# Patient Record
Sex: FEMALE | Race: OTHER | HISPANIC OR LATINO | URBAN - METROPOLITAN AREA
[De-identification: names, ages, dates, MRNs, and addresses within clinical notes are randomized per-mention and may not be internally consistent; named-entity substitution may affect disease eponyms.]

---

## 2023-02-21 ENCOUNTER — OUTPATIENT (OUTPATIENT)
Dept: OUTPATIENT SERVICES | Facility: HOSPITAL | Age: 28
LOS: 1 days | End: 2023-02-21
Payer: COMMERCIAL

## 2023-02-21 DIAGNOSIS — K02.9 DENTAL CARIES, UNSPECIFIED: ICD-10-CM

## 2023-02-21 PROCEDURE — D0140: CPT

## 2023-02-23 DIAGNOSIS — K02.63 DENTAL CARIES ON SMOOTH SURFACE PENETRATING INTO PULP: ICD-10-CM

## 2023-05-17 ENCOUNTER — OUTPATIENT (OUTPATIENT)
Dept: OUTPATIENT SERVICES | Facility: HOSPITAL | Age: 28
LOS: 1 days | End: 2023-05-17
Payer: COMMERCIAL

## 2023-05-17 DIAGNOSIS — K02.9 DENTAL CARIES, UNSPECIFIED: ICD-10-CM

## 2023-05-17 DIAGNOSIS — K01.0 EMBEDDED TEETH: ICD-10-CM

## 2023-05-17 PROCEDURE — D0140: CPT

## 2023-08-09 ENCOUNTER — OUTPATIENT (OUTPATIENT)
Dept: OUTPATIENT SERVICES | Facility: HOSPITAL | Age: 28
LOS: 1 days | End: 2023-08-09
Payer: COMMERCIAL

## 2023-08-09 DIAGNOSIS — K01.1 IMPACTED TEETH: ICD-10-CM

## 2023-08-09 DIAGNOSIS — K01.0 EMBEDDED TEETH: ICD-10-CM

## 2023-08-09 PROCEDURE — D7140: CPT

## 2023-10-09 ENCOUNTER — EMERGENCY (EMERGENCY)
Facility: HOSPITAL | Age: 28
LOS: 0 days | Discharge: HOME | End: 2023-10-10
Attending: EMERGENCY MEDICINE
Payer: MEDICAID

## 2023-10-09 VITALS
SYSTOLIC BLOOD PRESSURE: 110 MMHG | DIASTOLIC BLOOD PRESSURE: 68 MMHG | OXYGEN SATURATION: 100 % | HEART RATE: 94 BPM | RESPIRATION RATE: 16 BRPM

## 2023-10-09 VITALS
HEART RATE: 97 BPM | TEMPERATURE: 97 F | SYSTOLIC BLOOD PRESSURE: 118 MMHG | DIASTOLIC BLOOD PRESSURE: 57 MMHG | OXYGEN SATURATION: 97 % | RESPIRATION RATE: 16 BRPM

## 2023-10-09 DIAGNOSIS — M54.50 LOW BACK PAIN, UNSPECIFIED: ICD-10-CM

## 2023-10-09 DIAGNOSIS — F31.9 BIPOLAR DISORDER, UNSPECIFIED: ICD-10-CM

## 2023-10-09 DIAGNOSIS — O03.9 COMPLETE OR UNSPECIFIED SPONTANEOUS ABORTION WITHOUT COMPLICATION: ICD-10-CM

## 2023-10-09 DIAGNOSIS — R10.30 LOWER ABDOMINAL PAIN, UNSPECIFIED: ICD-10-CM

## 2023-10-09 DIAGNOSIS — O99.891 OTHER SPECIFIED DISEASES AND CONDITIONS COMPLICATING PREGNANCY: ICD-10-CM

## 2023-10-09 LAB
ALBUMIN SERPL ELPH-MCNC: 4 G/DL — SIGNIFICANT CHANGE UP (ref 3.5–5.2)
ALP SERPL-CCNC: 63 U/L — SIGNIFICANT CHANGE UP (ref 30–115)
ALT FLD-CCNC: 9 U/L — SIGNIFICANT CHANGE UP (ref 0–41)
ANION GAP SERPL CALC-SCNC: 9 MMOL/L — SIGNIFICANT CHANGE UP (ref 7–14)
APPEARANCE UR: ABNORMAL
AST SERPL-CCNC: 19 U/L — SIGNIFICANT CHANGE UP (ref 0–41)
BASOPHILS # BLD AUTO: 0.06 K/UL — SIGNIFICANT CHANGE UP (ref 0–0.2)
BASOPHILS NFR BLD AUTO: 0.7 % — SIGNIFICANT CHANGE UP (ref 0–1)
BILIRUB SERPL-MCNC: 0.3 MG/DL — SIGNIFICANT CHANGE UP (ref 0.2–1.2)
BILIRUB UR-MCNC: ABNORMAL
BLD GP AB SCN SERPL QL: SIGNIFICANT CHANGE UP
BUN SERPL-MCNC: 9 MG/DL — LOW (ref 10–20)
CALCIUM SERPL-MCNC: 9 MG/DL — SIGNIFICANT CHANGE UP (ref 8.4–10.4)
CHLORIDE SERPL-SCNC: 107 MMOL/L — SIGNIFICANT CHANGE UP (ref 98–110)
CO2 SERPL-SCNC: 23 MMOL/L — SIGNIFICANT CHANGE UP (ref 17–32)
COLOR SPEC: ABNORMAL
CREAT SERPL-MCNC: 0.8 MG/DL — SIGNIFICANT CHANGE UP (ref 0.7–1.5)
DIFF PNL FLD: ABNORMAL
EGFR: 102 ML/MIN/1.73M2 — SIGNIFICANT CHANGE UP
EOSINOPHIL # BLD AUTO: 0.26 K/UL — SIGNIFICANT CHANGE UP (ref 0–0.7)
EOSINOPHIL NFR BLD AUTO: 2.9 % — SIGNIFICANT CHANGE UP (ref 0–8)
GLUCOSE SERPL-MCNC: 96 MG/DL — SIGNIFICANT CHANGE UP (ref 70–99)
GLUCOSE UR QL: NEGATIVE MG/DL — SIGNIFICANT CHANGE UP
HCG SERPL-ACNC: 2107 MIU/ML — HIGH
HCT VFR BLD CALC: 33.4 % — LOW (ref 37–47)
HGB BLD-MCNC: 10.7 G/DL — LOW (ref 12–16)
IMM GRANULOCYTES NFR BLD AUTO: 0.3 % — SIGNIFICANT CHANGE UP (ref 0.1–0.3)
KETONES UR-MCNC: NEGATIVE MG/DL — SIGNIFICANT CHANGE UP
LEUKOCYTE ESTERASE UR-ACNC: ABNORMAL
LYMPHOCYTES # BLD AUTO: 3.22 K/UL — SIGNIFICANT CHANGE UP (ref 1.2–3.4)
LYMPHOCYTES # BLD AUTO: 35.7 % — SIGNIFICANT CHANGE UP (ref 20.5–51.1)
MCHC RBC-ENTMCNC: 27.8 PG — SIGNIFICANT CHANGE UP (ref 27–31)
MCHC RBC-ENTMCNC: 32 G/DL — SIGNIFICANT CHANGE UP (ref 32–37)
MCV RBC AUTO: 86.8 FL — SIGNIFICANT CHANGE UP (ref 81–99)
MONOCYTES # BLD AUTO: 0.56 K/UL — SIGNIFICANT CHANGE UP (ref 0.1–0.6)
MONOCYTES NFR BLD AUTO: 6.2 % — SIGNIFICANT CHANGE UP (ref 1.7–9.3)
NEUTROPHILS # BLD AUTO: 4.88 K/UL — SIGNIFICANT CHANGE UP (ref 1.4–6.5)
NEUTROPHILS NFR BLD AUTO: 54.2 % — SIGNIFICANT CHANGE UP (ref 42.2–75.2)
NITRITE UR-MCNC: POSITIVE
NRBC # BLD: 0 /100 WBCS — SIGNIFICANT CHANGE UP (ref 0–0)
PH UR: 6 — SIGNIFICANT CHANGE UP (ref 5–8)
PLATELET # BLD AUTO: 266 K/UL — SIGNIFICANT CHANGE UP (ref 130–400)
PMV BLD: 11.3 FL — HIGH (ref 7.4–10.4)
POTASSIUM SERPL-MCNC: 4.7 MMOL/L — SIGNIFICANT CHANGE UP (ref 3.5–5)
POTASSIUM SERPL-SCNC: 4.7 MMOL/L — SIGNIFICANT CHANGE UP (ref 3.5–5)
PROT SERPL-MCNC: 7.1 G/DL — SIGNIFICANT CHANGE UP (ref 6–8)
PROT UR-MCNC: 100 MG/DL
RBC # BLD: 3.85 M/UL — LOW (ref 4.2–5.4)
RBC # FLD: 14.3 % — SIGNIFICANT CHANGE UP (ref 11.5–14.5)
SODIUM SERPL-SCNC: 139 MMOL/L — SIGNIFICANT CHANGE UP (ref 135–146)
SP GR SPEC: >1.03 — HIGH (ref 1–1.03)
UROBILINOGEN FLD QL: 0.2 MG/DL — SIGNIFICANT CHANGE UP (ref 0.2–1)
WBC # BLD: 9.01 K/UL — SIGNIFICANT CHANGE UP (ref 4.8–10.8)
WBC # FLD AUTO: 9.01 K/UL — SIGNIFICANT CHANGE UP (ref 4.8–10.8)

## 2023-10-09 PROCEDURE — 87086 URINE CULTURE/COLONY COUNT: CPT

## 2023-10-09 PROCEDURE — 85025 COMPLETE CBC W/AUTO DIFF WBC: CPT

## 2023-10-09 PROCEDURE — 99285 EMERGENCY DEPT VISIT HI MDM: CPT

## 2023-10-09 PROCEDURE — 99282 EMERGENCY DEPT VISIT SF MDM: CPT

## 2023-10-09 PROCEDURE — 76830 TRANSVAGINAL US NON-OB: CPT

## 2023-10-09 PROCEDURE — 99284 EMERGENCY DEPT VISIT MOD MDM: CPT | Mod: 25

## 2023-10-09 PROCEDURE — 80053 COMPREHEN METABOLIC PANEL: CPT

## 2023-10-09 PROCEDURE — 88305 TISSUE EXAM BY PATHOLOGIST: CPT | Mod: 26

## 2023-10-09 PROCEDURE — 84702 CHORIONIC GONADOTROPIN TEST: CPT

## 2023-10-09 PROCEDURE — 36415 COLL VENOUS BLD VENIPUNCTURE: CPT

## 2023-10-09 PROCEDURE — 82962 GLUCOSE BLOOD TEST: CPT

## 2023-10-09 PROCEDURE — 81001 URINALYSIS AUTO W/SCOPE: CPT

## 2023-10-09 PROCEDURE — 86901 BLOOD TYPING SEROLOGIC RH(D): CPT

## 2023-10-09 PROCEDURE — 88305 TISSUE EXAM BY PATHOLOGIST: CPT

## 2023-10-09 PROCEDURE — 76830 TRANSVAGINAL US NON-OB: CPT | Mod: 26

## 2023-10-09 PROCEDURE — 86850 RBC ANTIBODY SCREEN: CPT

## 2023-10-09 PROCEDURE — 86900 BLOOD TYPING SEROLOGIC ABO: CPT

## 2023-10-09 RX ORDER — SODIUM CHLORIDE 9 MG/ML
1000 INJECTION, SOLUTION INTRAVENOUS ONCE
Refills: 0 | Status: COMPLETED | OUTPATIENT
Start: 2023-10-09 | End: 2023-10-09

## 2023-10-09 RX ORDER — ACETAMINOPHEN 500 MG
650 TABLET ORAL ONCE
Refills: 0 | Status: COMPLETED | OUTPATIENT
Start: 2023-10-09 | End: 2023-10-09

## 2023-10-09 RX ADMIN — SODIUM CHLORIDE 1000 MILLILITER(S): 9 INJECTION, SOLUTION INTRAVENOUS at 18:16

## 2023-10-09 RX ADMIN — Medication 650 MILLIGRAM(S): at 19:39

## 2023-10-09 NOTE — CONSULT NOTE ADULT - ASSESSMENT
27 yo  @ 9w4d, vaginal bleeding, passed products on exam, complete , clinically and hemodynamically stable    -Bleeding precautions given  -PO hydration encouraged  -repeat cbc expected drop  -Rechecked after 1 hour, saturated about 1 entire pad, bleeding improved  -Bedside U/S thin endometrial stripe  -DISPO per ED    Dr. Peña and Dr. James aware

## 2023-10-09 NOTE — PROGRESS NOTE ADULT - SUBJECTIVE AND OBJECTIVE BOX
Chief Complaint: vaginal bleeding    HPI: 27yo  @ 8w4d by LMP, presents for 5 days of vaginal bleeding, states it started as spotting and has progressed to at least 1 pad an hour over the last day, with clots, some tissue. Endorses abdominal cramping, intermittent, 6/10. She denies any lightheadedness, dizziness, nausea, vomiting, chest pain, sob, dysuria. She does not have an OBGYN she sees currently, she went to [facility] a week ago where she had a positive Upreg.     Ob/Gyn History:   (2x ft  1x sab no d&c)                 LMP -  8/3                  Denies history of ovarian cysts, uterine fibroids, abnormal paps, or STIs      OBhx:    Denies the following: constitutional symptoms, visual symptoms, cardiovascular symptoms, respiratory symptoms, GI symptoms, musculoskeletal symptoms, skin symptoms, neurologic symptoms, hematologic symptoms, allergic symptoms, psychiatric symptoms  Except any pertinent positives listed.     Home Medications:    abilify  lithium    Allergies    No Known Allergies    Intolerances        PAST MEDICAL & SURGICAL HISTORY:      FAMILY HISTORY:      SOCIAL HISTORY: Denies cigarette use, alcohol use, or illicit drug use    Vital Signs Last 24 Hrs  T(F): 97.4 (09 Oct 2023 14:46), Max: 97.4 (09 Oct 2023 14:46)  HR: 97 (09 Oct 2023 14:46) (97 - 97)  BP: 118/57 (09 Oct 2023 14:46) (118/57 - 118/57)  RR: 16 (09 Oct 2023 14:46) (16 - 16)      General Appearance - AAOx3, NAD  Abdomen - Soft, mildly tender to palpation, nondistended, no rebound, no rigidity, no guarding,     GYN/Pelvis:  External female genitalia - normal  Vagina - Normal  Cervix - Normal    Uterus:  Size - Normal  Tenderness - None  Mass - None  Freely mobile    Adnexa:  Masses - None  Tenderness - None    Meds:   acetaminophen     Tablet .. 650 milliGRAM(s) Oral once  lactated ringers Bolus 1000 milliLiter(s) IV Bolus once      LABS:                        11.8   8.23  )-----------( 268      ( 09 Oct 2023 17:36 )             36.1     HCG Quantitative, Serum: 2107.0 mIU/mL (10-09-23 @ 17:36)  ABO RH Interpretation: O POS (10-09-23 @ 20:01)  Antibody Screen: NEG (10-09-23 @ 20:01)    10-09    139  |  107  |  9<L>  ----------------------------<  96  4.7   |  23  |  0.8    Ca    9.0      09 Oct 2023 17:36    TPro  7.1  /  Alb  4.0  /  TBili  0.3  /  DBili  x   /  AST  19  /  ALT  9   /  AlkPhos  63  10-09      Urinalysis Basic - ( 09 Oct 2023 17:36 )    Color: Red / Appearance: Turbid / SG: >1.030 / pH: x  Gluc: x / Ketone: Negative mg/dL  / Bili: Moderate / Urobili: 0.2 mg/dL   Blood: x / Protein: 100 mg/dL / Nitrite: Positive   Leuk Esterase: Large / RBC: Too Numerous to count /HPF /  /HPF   Sq Epi: x / Non Sq Epi: 14 /HPF / Bacteria: Moderate /HPF          RADIOLOGY & ADDITIONAL STUDIES:  < from: US Transvaginal (10.09.23 @ 18:06) >    ACC: [id number] EXAM:  US TRANSVAGINAL   ORDERED BY: JILL WHITTINGTON     PROCEDURE DATE:  10/09/2023          INTERPRETATION:  CLINICAL HISTORY: 10 weeks pregnant patient with   abdominal pain and vaginal bleeding    COMPARISON: None.    PROCEDURE: Transabdominal and transvaginal ultrasound of the pelvis was   performed, including Doppler.    LMP: 2023    FINDINGS:    UTERUS: Anteverted measuring 9.5 x 4.4 x 5 cm, with normal echogenicity   and morphology. The endometrial echo complex is heterogeneous, with an   irregular shaped gestational sac in the lower uterine segment.    RIGHT OVARY: measures 1.9 x 1.5 x 1.5 cm, and is unremarkable. Doppler   flow is demonstrated to the right ovary.    LEFT OVARY: measures 2.2 x 1.5 x 1.8 cm, andis unremarkable. Doppler   flow is demonstrated to the left ovary.    OTHER: Small free fluid in the pelvis.    IMPRESSION:    Heterogeneous endometrium containing an irregular shaped gestational sac   in the lower uterine segment, consistent with spontaneous  in   progress.    --- End of Report ---            ALEXIA WOODS MD; Attending Radiologist  This document has been electronically signed. Oct  9 2023  7:47PM    < end of copied text >   Chief Complaint: vaginal bleeding    HPI: 27yo  @ 8w4d by LMP, presents for 5 days of vaginal bleeding, states it started as spotting and has progressed to at least 1 pad an hour over the last day, with clots, some tissue. Endorses abdominal cramping, intermittent, 6/10. She denies any lightheadedness, dizziness, nausea, vomiting, chest pain, sob, dysuria. She does not have an OBGYN she sees currently, she went to [facility] a week ago where she had a positive Upreg.     Ob/Gyn History:   (2x ft  1x sab no d&c)                 LMP -  8/3                  Denies history of ovarian cysts, uterine fibroids, abnormal paps, or STIs    OBhx:    Denies the following: constitutional symptoms, visual symptoms, cardiovascular symptoms, respiratory symptoms, GI symptoms, musculoskeletal symptoms, skin symptoms, neurologic symptoms, hematologic symptoms, allergic symptoms, psychiatric symptoms  Except any pertinent positives listed.     Home Medications:    abilify  lithium    Allergies    No Known Allergies    Intolerances        PAST MEDICAL & SURGICAL HISTORY:      FAMILY HISTORY:      SOCIAL HISTORY: Denies cigarette use, alcohol use, or illicit drug use    Vital Signs Last 24 Hrs  T(F): 97.4 (09 Oct 2023 14:46), Max: 97.4 (09 Oct 2023 14:46)  HR: 97 (09 Oct 2023 14:46) (97 - 97)  BP: 118/57 (09 Oct 2023 14:46) (118/57 - 118/57)  RR: 16 (09 Oct 2023 14:46) (16 - 16)      General Appearance - AAOx3, NAD  Abdomen - Soft, mildly tender to palpation, nondistended, no rebound, no rigidity, no guarding,     GYN/Pelvis:  External female genitalia - normal  Vagina - clots and pooling of blood, on valsalva large clots and products coming out, eventually bleeding slowed significantly  Cervix - dilated 1-2cm, products in the cervix    Uterus:  Size - Normal  Tenderness - None  Mass - None  Freely mobile    Adnexa:  Masses - None  Tenderness - None    Meds:   acetaminophen     Tablet .. 650 milliGRAM(s) Oral once  lactated ringers Bolus 1000 milliLiter(s) IV Bolus once      LABS:                        11.8   8.23  )-----------( 268      ( 09 Oct 2023 17:36 )             36.1     HCG Quantitative, Serum: 2107.0 mIU/mL (10-09-23 @ 17:36)  ABO RH Interpretation: O POS (10-09-23 @ 20:01)  Antibody Screen: NEG (10-09-23 @ 20:01)    10-09    139  |  107  |  9<L>  ----------------------------<  96  4.7   |  23  |  0.8    Ca    9.0      09 Oct 2023 17:36    TPro  7.1  /  Alb  4.0  /  TBili  0.3  /  DBili  x   /  AST  19  /  ALT  9   /  AlkPhos  63  10-09      Urinalysis Basic - ( 09 Oct 2023 17:36 )    Color: Red / Appearance: Turbid / SG: >1.030 / pH: x  Gluc: x / Ketone: Negative mg/dL  / Bili: Moderate / Urobili: 0.2 mg/dL   Blood: x / Protein: 100 mg/dL / Nitrite: Positive   Leuk Esterase: Large / RBC: Too Numerous to count /HPF /  /HPF   Sq Epi: x / Non Sq Epi: 14 /HPF / Bacteria: Moderate /HPF          RADIOLOGY & ADDITIONAL STUDIES:  < from: US Transvaginal (10.09.23 @ 18:06) >    ACC: [id number] EXAM:  US TRANSVAGINAL   ORDERED BY: JILL WHITTINGTON     PROCEDURE DATE:  10/09/2023          INTERPRETATION:  CLINICAL HISTORY: 10 weeks pregnant patient with   abdominal pain and vaginal bleeding    COMPARISON: None.    PROCEDURE: Transabdominal and transvaginal ultrasound of the pelvis was   performed, including Doppler.    LMP: 2023    FINDINGS:    UTERUS: Anteverted measuring 9.5 x 4.4 x 5 cm, with normal echogenicity   and morphology. The endometrial echo complex is heterogeneous, with an   irregular shaped gestational sac in the lower uterine segment.    RIGHT OVARY: measures 1.9 x 1.5 x 1.5 cm, and is unremarkable. Doppler   flow is demonstrated to the right ovary.    LEFT OVARY: measures 2.2 x 1.5 x 1.8 cm, andis unremarkable. Doppler   flow is demonstrated to the left ovary.    OTHER: Small free fluid in the pelvis.    IMPRESSION:    Heterogeneous endometrium containing an irregular shaped gestational sac   in the lower uterine segment, consistent with spontaneous  in   progress.    --- End of Report ---            ALEXIA WOODS MD; Attending Radiologist  This document has been electronically signed. Oct  9 2023  7:47PM    < end of copied text >

## 2023-10-09 NOTE — ED PROVIDER NOTE - NS ED ATTENDING STATEMENT MOD
This was a shared visit with the AIDE. I reviewed and verified the documentation and independently performed the documented:

## 2023-10-09 NOTE — ED PROVIDER NOTE - CLINICAL SUMMARY MEDICAL DECISION MAKING FREE TEXT BOX
This pregnant patient presents with vaginal bleeding in the first trimester. Differential includes ectopic, IUP, threatened/inevitable , along with completed . Patient without a history of coagulopathy or infectious symptoms. Doubt alternate acute emergent pathology. VSS, os closed, labs WNL, Rh positive, UA neg, US shows IUP with FH, will FU with OBGYN as outpt.    They were given detailed return precautions and advised to return to the emergency department in 2-3 days if not improving or sooner if any new symptoms develop, symptoms worsened or for any concerns. They were offered the opportunity to ask questions and verbalized that they understand the diagnosis and discharge instructions.

## 2023-10-09 NOTE — ED PROVIDER NOTE - NSFOLLOWUPCLINICS_GEN_ALL_ED_FT
[facility]  OB/GYN  [address] [place] [address]  Phone: [phone]  Fax:   Follow Up Time: 1-3 Days

## 2023-10-09 NOTE — ED PROVIDER NOTE - PATIENT PORTAL LINK FT
You can access the FollowMyHealth Patient Portal offered by [facility] by registering at the following website: [email or web address]. By joining [employer]’s FollowMyHealth portal, you will also be able to view your health information using other applications (apps) compatible with our system.

## 2023-10-09 NOTE — ED PROVIDER NOTE - ATTENDING APP SHARED VISIT CONTRIBUTION OF CARE
I have reviewed and agree with the mid-level note, except as documented in my note below.    28-year-old female history of bipolar disorder, denies significant PSH, non-smoker, denies daily EtOH use, UCG positive, LMP 8/2, -0-1-2, now presents with vaginal bleeding for the past 5 days, associated clots and lower abdominal crampy discomfort, denies abdominal pain, n/v/d, chest pain, palpitations, SOB, dizziness, near syncope or syncope, fever, anorexia, respiratory sx, change in bowel habits or urinary sx, vaginal d/c or other associated complaints at present. No old chart available for review. I have reviewed and agree with the initial nursing note, except as documented in my note.    VSS, awake, alert, no scleral icterus, oropharynx clear, mmm, no jaundice, skin rash or lesions, chest CTAB, non-labored breathing, no w/r/r, +S1/S2, RRR, no m/r/g, abdomen soft, NT, +BS, no scars, hernias or distention, no pulsatile masses or bruits appreciated, no CVA tenderness, no peripheral edema or deformities, alert, clear speech and steady gait.

## 2023-10-09 NOTE — CONSULT NOTE ADULT - SUBJECTIVE AND OBJECTIVE BOX
Chief Complaint: vaginal bleeding    HPI: 29yo  @ 8w4d by LMP, presents for 5 days of vaginal bleeding, states it started as spotting and has progressed to at least 1 pad an hour over the last day, with clots, some tissue. Endorses abdominal cramping, intermittent, 6/10. She denies any lightheadedness, dizziness, nausea, vomiting, chest pain, sob, dysuria. She does not have an OBGYN she sees currently, she went to [facility] a week ago where she had a positive Upreg.     Ob/Gyn History:   (2x ft  1x sab no d&c)                 LMP -  8/3                  Denies history of ovarian cysts, uterine fibroids, abnormal paps, or STIs    OBhx:    Denies the following: constitutional symptoms, visual symptoms, cardiovascular symptoms, respiratory symptoms, GI symptoms, musculoskeletal symptoms, skin symptoms, neurologic symptoms, hematologic symptoms, allergic symptoms, psychiatric symptoms  Except any pertinent positives listed.     Home Medications:    abilify  lithium    Allergies    No Known Allergies    Intolerances        PAST MEDICAL & SURGICAL HISTORY:      FAMILY HISTORY:      SOCIAL HISTORY: Denies cigarette use, alcohol use, or illicit drug use    Vital Signs Last 24 Hrs  T(F): 97.4 (09 Oct 2023 14:46), Max: 97.4 (09 Oct 2023 14:46)  HR: 97 (09 Oct 2023 14:46) (97 - 97)  BP: 118/57 (09 Oct 2023 14:46) (118/57 - 118/57)  RR: 16 (09 Oct 2023 14:46) (16 - 16)      General Appearance - AAOx3, NAD  Abdomen - Soft, mildly tender to palpation, nondistended, no rebound, no rigidity, no guarding,     GYN/Pelvis:  External female genitalia - normal  Vagina - clots and pooling of blood, on valsalva large clots and products coming out, eventually bleeding slowed significantly  Cervix - dilated 1-2cm, products in the cervix    Uterus:  Size - Normal  Tenderness - None  Mass - None  Freely mobile    Adnexa:  Masses - None  Tenderness - None    Meds:   acetaminophen     Tablet .. 650 milliGRAM(s) Oral once  lactated ringers Bolus 1000 milliLiter(s) IV Bolus once      LABS:                        11.8   8.23  )-----------( 268      ( 09 Oct 2023 17:36 )             36.1     HCG Quantitative, Serum: 2107.0 mIU/mL (10-09-23 @ 17:36)  ABO RH Interpretation: O POS (10-09-23 @ 20:01)  Antibody Screen: NEG (10-09-23 @ 20:01)    10-09    139  |  107  |  9<L>  ----------------------------<  96  4.7   |  23  |  0.8    Ca    9.0      09 Oct 2023 17:36    TPro  7.1  /  Alb  4.0  /  TBili  0.3  /  DBili  x   /  AST  19  /  ALT  9   /  AlkPhos  63  10-09      Urinalysis Basic - ( 09 Oct 2023 17:36 )    Color: Red / Appearance: Turbid / SG: >1.030 / pH: x  Gluc: x / Ketone: Negative mg/dL  / Bili: Moderate / Urobili: 0.2 mg/dL   Blood: x / Protein: 100 mg/dL / Nitrite: Positive   Leuk Esterase: Large / RBC: Too Numerous to count /HPF /  /HPF   Sq Epi: x / Non Sq Epi: 14 /HPF / Bacteria: Moderate /HPF          RADIOLOGY & ADDITIONAL STUDIES:  < from: US Transvaginal (10.09.23 @ 18:06) >    ACC: [id number] EXAM:  US TRANSVAGINAL   ORDERED BY: JILL WHITTINGTON     PROCEDURE DATE:  10/09/2023          INTERPRETATION:  CLINICAL HISTORY: 10 weeks pregnant patient with   abdominal pain and vaginal bleeding    COMPARISON: None.    PROCEDURE: Transabdominal and transvaginal ultrasound of the pelvis was   performed, including Doppler.    LMP: 2023    FINDINGS:    UTERUS: Anteverted measuring 9.5 x 4.4 x 5 cm, with normal echogenicity   and morphology. The endometrial echo complex is heterogeneous, with an   irregular shaped gestational sac in the lower uterine segment.    RIGHT OVARY: measures 1.9 x 1.5 x 1.5 cm, and is unremarkable. Doppler   flow is demonstrated to the right ovary.    LEFT OVARY: measures 2.2 x 1.5 x 1.8 cm, andis unremarkable. Doppler   flow is demonstrated to the left ovary.    OTHER: Small free fluid in the pelvis.    IMPRESSION:    Heterogeneous endometrium containing an irregular shaped gestational sac   in the lower uterine segment, consistent with spontaneous  in   progress.    --- End of Report ---            ALEXIA WOODS MD; Attending Radiologist  This document has been electronically signed. Oct  9 2023  7:47PM    < end of copied text >

## 2023-10-09 NOTE — ED PROVIDER NOTE - PHYSICAL EXAMINATION
As Follows:  CONST: Well appearing in NAD  EYES: PERRL, EOMI, Sclera and conjunctiva clear.   ENT: No nasal discharge. Oropharynx normal appearing, no erythema or exudates. Uvula midline  CARD: No murmurs, rubs, or gallops; Normal rate and rhythm  RESP: BS Equal B/L, No wheezes, rhonchi or rales. No distress or accessory breathing  GI: RLQ tenderness. Soft, non-distended. No cva tenderness.   SKIN: Warm, dry, no acute rashes. MMM  NEURO: A&Ox3, No focal deficits. Strength and sensation intact. Steady gait

## 2023-10-09 NOTE — ED PROVIDER NOTE - PROGRESS NOTE DETAILS
Pt reports improvement in symptoms, bleeding stopped, pt cleared by OBGYN. Discussed results and provided copy to pt. Pt understands to follow-up with OBGYN. Pt understands to return to ED if symptoms return/worsen. Agreeable to discharge. Pt had vasovagal episode while placing IV, improved quickly with fluids/juice.

## 2023-10-09 NOTE — ED ADULT NURSE NOTE - NSFALLUNIVINTERV_ED_ALL_ED
Bed/Stretcher in lowest position, wheels locked, appropriate side rails in place/Call bell, personal items and telephone in reach/Instruct patient to call for assistance before getting out of bed/chair/stretcher/Non-slip footwear applied when patient is off stretcher/[place] to call system/Physically safe environment - no spills, clutter or unnecessary equipment/Purposeful proactive rounding/Room/bathroom lighting operational, light cord in reach

## 2023-10-09 NOTE — ED PROVIDER NOTE - OBJECTIVE STATEMENT
Patient is a 28 year old female with pmhx of bipolar, M1 history just under 10 weeks, LMP 8/2, presents for evaluation of lower abdominal pain radiating to b/l lower back and vaginal bleeding for 5 days. Patient had positive pregnancy on 10/4 at the medicine clinic. She denies any associated fever, cough, sore throat, N/V, chest pain, sob, or urinary dysuria.

## 2023-10-09 NOTE — PROGRESS NOTE ADULT - ASSESSMENT
27 yo  @ 9w4d, vaginal bleeding, passed products on exam, spontaneous , clinically and hemodynamically stable    -Bleeding precautions given  -PO hydration encouraged  -repeat CBC  -Will recheck bleeding before d/c    Dr. Peña and Dr. James aware

## 2023-10-11 LAB — SURGICAL PATHOLOGY STUDY: SIGNIFICANT CHANGE UP

## 2025-07-18 NOTE — ED ADULT TRIAGE NOTE - SOURCE OF INFORMATION
"REASON FOR CONVERSATION: Medication Management  Esc to on call Dr Lubin: pt has complaint tonight of taking lasix 40 mg last Friday and this Monday and now has less urge to void and has dry skin and eyes. Voiding 3-4 times per day compared to 10 times per day. Is still voiding good amount and edema is not present currently. Also started RexUlti last Wednesday.   SYMPTOMS: less urge to urinate and dry skin/eyes.    OTHER HEALTH INFORMATION:     PROTOCOL DISPOSITION: Call PCP Now    CARE ADVICE PROVIDED: As per on call : Please have her contact the office in the morning, so they can see one of our advanced practitioners. As long as there are no urgent issues for  tonight it can be discussed with an office visit. I would not take any Lasix if there is not swelling, she may be dehydrating herself.    PRACTICE FOLLOW-UP: Please follow up with patient for in office appointment.      Reason for Disposition   [1] Caller has URGENT medicine question about med that PCP or specialist prescribed AND [2] triager unable to answer question    Answer Assessment - Initial Assessment Questions  1. NAME of MEDICINE: \"What medicine(s) are you calling about?\"      Lasix 40 mg prn as needed  2. QUESTION: \"What is your question?\" (e.g., double dose of medicine, side effect)      Is this drop of in urination urge normal ? Is generalized dryness normal>   3. PRESCRIBER: \"Who prescribed the medicine?\" Reason: if prescribed by specialist, call should be referred to that group.      Nephrology   4. SYMPTOMS: \"Do you have any symptoms?\" If Yes, ask: \"What symptoms are you having?\"  \"How bad are the symptoms (e.g., mild, moderate, severe)      Took lasix 40 mg last Friday and this Monday and now has less urge to void and has dry skin and eyes. Voiding 3-4 times per day compared to 10 times per day. Is still voiding good amount and edema is not present currently. Also started RexUlti last Wednesday.   5. PREGNANCY:  \"Is there any chance " "that you are pregnant?\" \"When was your last menstrual period?\"      No    Protocols used: Medication Question Call-Adult-    " Patient